# Patient Record
Sex: FEMALE | Race: WHITE | NOT HISPANIC OR LATINO | URBAN - NONMETROPOLITAN AREA
[De-identification: names, ages, dates, MRNs, and addresses within clinical notes are randomized per-mention and may not be internally consistent; named-entity substitution may affect disease eponyms.]

---

## 2023-04-05 LAB
ALANINE AMINOTRANSFERASE (SGPT) (U/L) IN SER/PLAS: 12 U/L (ref 7–45)
ALBUMIN (G/DL) IN SER/PLAS: 4.3 G/DL (ref 3.4–5)
ALKALINE PHOSPHATASE (U/L) IN SER/PLAS: 59 U/L (ref 33–110)
ANION GAP IN SER/PLAS: 14 MMOL/L (ref 10–20)
ASPARTATE AMINOTRANSFERASE (SGOT) (U/L) IN SER/PLAS: 17 U/L (ref 9–39)
BASOPHILS (10*3/UL) IN BLOOD BY AUTOMATED COUNT: 0.03 X10E9/L (ref 0–0.1)
BASOPHILS/100 LEUKOCYTES IN BLOOD BY AUTOMATED COUNT: 0.8 % (ref 0–2)
BILIRUBIN TOTAL (MG/DL) IN SER/PLAS: 0.5 MG/DL (ref 0–1.2)
CALCIUM (MG/DL) IN SER/PLAS: 9.3 MG/DL (ref 8.6–10.6)
CANCER AG 27-29 (U/ML) IN SER/PLAS: 29.6 U/ML (ref 0–38.6)
CARBON DIOXIDE, TOTAL (MMOL/L) IN SER/PLAS: 28 MMOL/L (ref 21–32)
CARCINOEMBRYONIC AG (NG/ML) IN SER/PLAS: 0.5 UG/L
CHLORIDE (MMOL/L) IN SER/PLAS: 103 MMOL/L (ref 98–107)
CREATININE (MG/DL) IN SER/PLAS: 0.81 MG/DL (ref 0.5–1.05)
EOSINOPHILS (10*3/UL) IN BLOOD BY AUTOMATED COUNT: 0.01 X10E9/L (ref 0–0.7)
EOSINOPHILS/100 LEUKOCYTES IN BLOOD BY AUTOMATED COUNT: 0.3 % (ref 0–6)
ERYTHROCYTE DISTRIBUTION WIDTH (RATIO) BY AUTOMATED COUNT: 14.9 % (ref 11.5–14.5)
ERYTHROCYTE MEAN CORPUSCULAR HEMOGLOBIN CONCENTRATION (G/DL) BY AUTOMATED: 30.8 G/DL (ref 32–36)
ERYTHROCYTE MEAN CORPUSCULAR VOLUME (FL) BY AUTOMATED COUNT: 86 FL (ref 80–100)
ERYTHROCYTES (10*6/UL) IN BLOOD BY AUTOMATED COUNT: 5.12 X10E12/L (ref 4–5.2)
GFR FEMALE: 85 ML/MIN/1.73M2
GLUCOSE (MG/DL) IN SER/PLAS: 93 MG/DL (ref 74–99)
HEMATOCRIT (%) IN BLOOD BY AUTOMATED COUNT: 44.2 % (ref 36–46)
HEMOGLOBIN (G/DL) IN BLOOD: 13.6 G/DL (ref 12–16)
IMMATURE GRANULOCYTES/100 LEUKOCYTES IN BLOOD BY AUTOMATED COUNT: 0 % (ref 0–0.9)
LEUKOCYTES (10*3/UL) IN BLOOD BY AUTOMATED COUNT: 3.9 X10E9/L (ref 4.4–11.3)
LYMPHOCYTES (10*3/UL) IN BLOOD BY AUTOMATED COUNT: 1.45 X10E9/L (ref 1.2–4.8)
LYMPHOCYTES/100 LEUKOCYTES IN BLOOD BY AUTOMATED COUNT: 37 % (ref 13–44)
MONOCYTES (10*3/UL) IN BLOOD BY AUTOMATED COUNT: 0.35 X10E9/L (ref 0.1–1)
MONOCYTES/100 LEUKOCYTES IN BLOOD BY AUTOMATED COUNT: 8.9 % (ref 2–10)
NEUTROPHILS (10*3/UL) IN BLOOD BY AUTOMATED COUNT: 2.08 X10E9/L (ref 1.2–7.7)
NEUTROPHILS/100 LEUKOCYTES IN BLOOD BY AUTOMATED COUNT: 53 % (ref 40–80)
NRBC (PER 100 WBCS) BY AUTOMATED COUNT: 0 /100 WBC (ref 0–0)
PLATELETS (10*3/UL) IN BLOOD AUTOMATED COUNT: 256 X10E9/L (ref 150–450)
POTASSIUM (MMOL/L) IN SER/PLAS: 3.7 MMOL/L (ref 3.5–5.3)
PROTEIN TOTAL: 6.6 G/DL (ref 6.4–8.2)
SODIUM (MMOL/L) IN SER/PLAS: 141 MMOL/L (ref 136–145)
UREA NITROGEN (MG/DL) IN SER/PLAS: 14 MG/DL (ref 6–23)

## 2023-08-16 PROBLEM — Z85.3 PERSONAL HISTORY OF MALIGNANT NEOPLASM OF BREAST: Status: ACTIVE | Noted: 2023-08-16

## 2023-08-16 PROBLEM — C50.211 MALIGNANT NEOPLASM OF UPPER-INNER QUADRANT OF RIGHT FEMALE BREAST (MULTI): Status: ACTIVE | Noted: 2023-08-16

## 2023-08-16 PROBLEM — R92.30 DENSE BREASTS: Status: ACTIVE | Noted: 2023-08-16

## 2023-08-16 PROBLEM — R92.2 DENSE BREASTS: Status: ACTIVE | Noted: 2023-08-16

## 2023-08-16 PROBLEM — R92.8 ABNORMAL MAMMOGRAM: Status: ACTIVE | Noted: 2023-08-16

## 2023-08-16 RX ORDER — ANASTROZOLE 1 MG/1
1 TABLET ORAL
COMMUNITY
End: 2023-10-12 | Stop reason: SDUPTHER

## 2023-08-16 RX ORDER — ALENDRONATE SODIUM 70 MG/1
70 TABLET ORAL
COMMUNITY
End: 2023-10-12 | Stop reason: SDUPTHER

## 2023-10-05 ENCOUNTER — LAB (OUTPATIENT)
Dept: LAB | Facility: CLINIC | Age: 58
End: 2023-10-05
Payer: COMMERCIAL

## 2023-10-05 DIAGNOSIS — C50.919 MALIGNANT NEOPLASM OF UNSPECIFIED SITE OF UNSPECIFIED FEMALE BREAST (MULTI): Primary | ICD-10-CM

## 2023-10-05 LAB
BASOPHILS # BLD AUTO: 0.03 X10*3/UL (ref 0–0.1)
BASOPHILS NFR BLD AUTO: 0.6 %
EOSINOPHIL # BLD AUTO: 0.08 X10*3/UL (ref 0–0.7)
EOSINOPHIL NFR BLD AUTO: 1.5 %
ERYTHROCYTE [DISTWIDTH] IN BLOOD BY AUTOMATED COUNT: 14.1 % (ref 11.5–14.5)
HCT VFR BLD AUTO: 43.9 % (ref 36–46)
HGB BLD-MCNC: 14 G/DL (ref 12–16)
IMM GRANULOCYTES # BLD AUTO: 0.01 X10*3/UL (ref 0–0.7)
IMM GRANULOCYTES NFR BLD AUTO: 0.2 % (ref 0–0.9)
LYMPHOCYTES # BLD AUTO: 1.87 X10*3/UL (ref 1.2–4.8)
LYMPHOCYTES NFR BLD AUTO: 36.1 %
MCH RBC QN AUTO: 27.6 PG (ref 26–34)
MCHC RBC AUTO-ENTMCNC: 31.9 G/DL (ref 32–36)
MCV RBC AUTO: 86 FL (ref 80–100)
MONOCYTES # BLD AUTO: 0.47 X10*3/UL (ref 0.1–1)
MONOCYTES NFR BLD AUTO: 9.1 %
NEUTROPHILS # BLD AUTO: 2.72 X10*3/UL (ref 1.2–7.7)
NEUTROPHILS NFR BLD AUTO: 52.5 %
NRBC BLD-RTO: ABNORMAL /100{WBCS}
PLATELET # BLD AUTO: 203 X10*3/UL (ref 150–450)
PMV BLD AUTO: 11.2 FL (ref 7.5–11.5)
RBC # BLD AUTO: 5.08 X10*6/UL (ref 4–5.2)
WBC # BLD AUTO: 5.2 X10*3/UL (ref 4.4–11.3)

## 2023-10-05 PROCEDURE — 85025 COMPLETE CBC W/AUTO DIFF WBC: CPT

## 2023-10-05 PROCEDURE — 82378 CARCINOEMBRYONIC ANTIGEN: CPT

## 2023-10-05 PROCEDURE — 86300 IMMUNOASSAY TUMOR CA 15-3: CPT

## 2023-10-05 PROCEDURE — 36415 COLL VENOUS BLD VENIPUNCTURE: CPT

## 2023-10-05 PROCEDURE — 84075 ASSAY ALKALINE PHOSPHATASE: CPT

## 2023-10-05 PROCEDURE — 80053 COMPREHEN METABOLIC PANEL: CPT

## 2023-10-06 ENCOUNTER — TELEPHONE (OUTPATIENT)
Dept: HEMATOLOGY/ONCOLOGY | Facility: CLINIC | Age: 58
End: 2023-10-06
Payer: COMMERCIAL

## 2023-10-06 LAB
ALBUMIN SERPL BCP-MCNC: 4.5 G/DL (ref 3.4–5)
ALP SERPL-CCNC: 57 U/L (ref 33–110)
ALT SERPL W P-5'-P-CCNC: 12 U/L (ref 7–45)
ANION GAP SERPL CALC-SCNC: 15 MMOL/L (ref 10–20)
AST SERPL W P-5'-P-CCNC: 17 U/L (ref 9–39)
BILIRUB SERPL-MCNC: 0.5 MG/DL (ref 0–1.2)
BUN SERPL-MCNC: 13 MG/DL (ref 6–23)
CALCIUM SERPL-MCNC: 9.7 MG/DL (ref 8.6–10.6)
CANCER AG27-29 SERPL-ACNC: 31.3 U/ML (ref 0–38.6)
CEA SERPL-MCNC: <0.5 UG/L
CHLORIDE SERPL-SCNC: 104 MMOL/L (ref 98–107)
CO2 SERPL-SCNC: 28 MMOL/L (ref 21–32)
CREAT SERPL-MCNC: 0.78 MG/DL (ref 0.5–1.05)
GFR SERPL CREATININE-BSD FRML MDRD: 89 ML/MIN/1.73M*2
GLUCOSE SERPL-MCNC: 78 MG/DL (ref 74–99)
POTASSIUM SERPL-SCNC: 4.1 MMOL/L (ref 3.5–5.3)
PROT SERPL-MCNC: 6.9 G/DL (ref 6.4–8.2)
SODIUM SERPL-SCNC: 143 MMOL/L (ref 136–145)

## 2023-10-06 NOTE — TELEPHONE ENCOUNTER
Call placed to patient to request to bump FUV x 6 weeks due to implementation of new computer system.  Message left on identifiable voicemail requesting to move out follow-up x 6 weeks.  Offered to review lab results with nurse.  Call back instructions left.

## 2023-10-09 ENCOUNTER — TELEPHONE (OUTPATIENT)
Dept: HEMATOLOGY/ONCOLOGY | Facility: CLINIC | Age: 58
End: 2023-10-09
Payer: COMMERCIAL

## 2023-10-09 NOTE — TELEPHONE ENCOUNTER
Loly said that she was called on Friday about moving her appointment out 6 weeks. She is scheduled tomorrow. She said she will plan on showing up to tomorrows appointment unless she is called today to reschedule.

## 2023-10-09 NOTE — TELEPHONE ENCOUNTER
Spoke to Loly and she is agreeable to cancel her FUV with Dr. Christina tomorrow on 10/10/23 and reschedule for sometime in November.  States she already had her labs drawn so no need to reorder any additional labs for this FUV.  Message forwarded to reshma, Chica Cutler to make adjustments to schedule.

## 2023-10-10 ENCOUNTER — APPOINTMENT (OUTPATIENT)
Dept: HEMATOLOGY/ONCOLOGY | Facility: CLINIC | Age: 58
End: 2023-10-10
Payer: COMMERCIAL

## 2023-10-12 ENCOUNTER — TELEPHONE (OUTPATIENT)
Dept: HEMATOLOGY/ONCOLOGY | Facility: HOSPITAL | Age: 58
End: 2023-10-12
Payer: COMMERCIAL

## 2023-10-12 DIAGNOSIS — C50.211 MALIGNANT NEOPLASM OF UPPER-INNER QUADRANT OF RIGHT FEMALE BREAST, UNSPECIFIED ESTROGEN RECEPTOR STATUS (MULTI): ICD-10-CM

## 2023-10-12 RX ORDER — ANASTROZOLE 1 MG/1
1 TABLET ORAL DAILY
Qty: 90 TABLET | Refills: 1 | Status: SHIPPED | OUTPATIENT
Start: 2023-10-12 | End: 2024-04-09 | Stop reason: WASHOUT

## 2023-10-12 RX ORDER — ALENDRONATE SODIUM 70 MG/1
70 TABLET ORAL
Qty: 12 TABLET | Refills: 3 | Status: SHIPPED | OUTPATIENT
Start: 2023-10-12

## 2023-10-12 NOTE — TELEPHONE ENCOUNTER
Patient calling to let us know [facility]  Please send Arimidex and Fosamax.

## 2023-10-24 ENCOUNTER — TELEPHONE (OUTPATIENT)
Dept: HEMATOLOGY/ONCOLOGY | Facility: CLINIC | Age: 58
End: 2023-10-24
Payer: COMMERCIAL

## 2023-10-24 NOTE — TELEPHONE ENCOUNTER
Patient states that she is trying to refill Anastrozole 1mg and [facility] is waiting on a response from our office.    Please advise so she can get prescription refilled.

## 2023-10-24 NOTE — TELEPHONE ENCOUNTER
Reviewed medications.  Noted refill sent on the 12th.  Call returned to patient.  Patient will call [facility] and call back if any issues.  Call back instructions reviewed.  Patient verbalized understanding.

## 2023-11-01 NOTE — TELEPHONE ENCOUNTER
Call received from [facility] after hours.  Call returned.  Patient identifiers provided.  Per [facility] - No patient on file.  Called patient who confirmed [facility] under her name.  ID provided by maura. [id number].  Call again placed to [facility].  Per rep no RX for Arimidex received.  Confirmed by EMR receipt of RX on 10/12 at 12:34.   Rep reached out to pharmacy.  Confirmed RX was there and will contact patient to arrange delivery,  Call back instructions reviewed.  Patient verbalized understanding.

## 2023-11-17 ASSESSMENT — PATIENT HEALTH QUESTIONNAIRE - PHQ9
3. TROUBLE FALLING OR STAYING ASLEEP OR SLEEPING TOO MUCH: NOT AT ALL
6. FEELING BAD ABOUT YOURSELF - OR THAT YOU ARE A FAILURE OR HAVE LET YOURSELF OR YOUR FAMILY DOWN: NOT AT ALL
7. TROUBLE CONCENTRATING ON THINGS, SUCH AS READING THE NEWSPAPER OR WATCHING TELEVISION: NOT AT ALL
1. LITTLE INTEREST OR PLEASURE IN DOING THINGS: NOT AT ALL
2. FEELING DOWN, DEPRESSED, IRRITABLE, OR HOPELESS: NOT AT ALL
2. FEELING DOWN, DEPRESSED, IRRITABLE, OR HOPELESS: 0
SUM OF ALL RESPONSES TO PHQ QUESTIONS 1-9: 0
3. TROUBLE FALLING OR STAYING ASLEEP OR SLEEPING TOO MUCH: NOT AT ALL
10. IF YOU CHECKED OFF ANY PROBLEMS, HOW DIFFICULT HAVE THESE PROBLEMS MADE IT FOR YOU TO DO YOUR WORK, TAKE CARE OF THINGS AT HOME, OR GET ALONG WITH OTHER PEOPLE: NOT DIFFICULT AT ALL
3. TROUBLE FALLING OR STAYING ASLEEP OR SLEEPING TOO MUCH: 0
9. THOUGHTS THAT YOU WOULD BE BETTER OFF DEAD, OR OF HURTING YOURSELF: NOT AT ALL
8. MOVING OR SPEAKING SO SLOWLY THAT OTHER PEOPLE COULD HAVE NOTICED. OR THE OPPOSITE, BEING SO FIGETY OR RESTLESS THAT YOU HAVE BEEN MOVING AROUND A LOT MORE THAN USUAL: 0
7. TROUBLE CONCENTRATING ON THINGS, SUCH AS READING THE NEWSPAPER OR WATCHING TELEVISION: NOT AT ALL
9. THOUGHTS THAT YOU WOULD BE BETTER OFF DEAD, OR OF HURTING YOURSELF: 0
4. FEELING TIRED OR HAVING LITTLE ENERGY: NOT AT ALL
8. MOVING OR SPEAKING SO SLOWLY THAT OTHER PEOPLE COULD HAVE NOTICED. OR THE OPPOSITE, BEING SO FIGETY OR RESTLESS THAT YOU HAVE BEEN MOVING AROUND A LOT MORE THAN USUAL: NOT AT ALL
1. LITTLE INTEREST OR PLEASURE IN DOING THINGS: NOT AT ALL
5. POOR APPETITE OR OVEREATING: NOT AT ALL
6. FEELING BAD ABOUT YOURSELF - OR THAT YOU ARE A FAILURE OR HAVE LET YOURSELF OR YOUR FAMILY DOWN: 0
6. FEELING BAD ABOUT YOURSELF - OR THAT YOU ARE A FAILURE OR HAVE LET YOURSELF OR YOUR FAMILY DOWN: NOT AT ALL
5. POOR APPETITE OR OVEREATING: 0
2. FEELING DOWN, DEPRESSED OR HOPELESS: NOT AT ALL
4. FEELING TIRED OR HAVING LITTLE ENERGY: 0
4. FEELING TIRED OR HAVING LITTLE ENERGY: NOT AT ALL
1. LITTLE INTEREST OR PLEASURE IN DOING THINGS: 0
7. TROUBLE CONCENTRATING ON THINGS, SUCH AS READING THE NEWSPAPER OR WATCHING TELEVISION: 0
9. THOUGHTS THAT YOU WOULD BE BETTER OFF DEAD, OR OF HURTING YOURSELF: NOT AT ALL
8. MOVING OR SPEAKING SO SLOWLY THAT OTHER PEOPLE COULD HAVE NOTICED. OR THE OPPOSITE, BEING SO FIGETY OR RESTLESS THAT YOU HAVE BEEN MOVING AROUND A LOT MORE THAN USUAL: NOT AT ALL
5. POOR APPETITE OR OVEREATING: NOT AT ALL
SUM OF ALL RESPONSES TO PHQ QUESTIONS 1-9: 0
10. IF YOU CHECKED OFF ANY PROBLEMS, HOW DIFFICULT HAVE THESE PROBLEMS MADE IT FOR YOU TO DO YOUR WORK, TAKE CARE OF THINGS AT HOME, OR GET ALONG WITH OTHER PEOPLE: NOT DIFFICULT AT ALL

## 2023-11-21 ENCOUNTER — OFFICE VISIT (OUTPATIENT)
Dept: HEMATOLOGY/ONCOLOGY | Facility: CLINIC | Age: 58
End: 2023-11-21
Payer: COMMERCIAL

## 2023-11-21 VITALS
TEMPERATURE: 97.2 F | OXYGEN SATURATION: 98 % | BODY MASS INDEX: 24.18 KG/M2 | SYSTOLIC BLOOD PRESSURE: 125 MMHG | HEIGHT: 63 IN | HEART RATE: 62 BPM | WEIGHT: 136.47 LBS | DIASTOLIC BLOOD PRESSURE: 80 MMHG | RESPIRATION RATE: 12 BRPM

## 2023-11-21 DIAGNOSIS — Z17.0 MALIGNANT NEOPLASM OF UPPER-INNER QUADRANT OF RIGHT BREAST IN FEMALE, ESTROGEN RECEPTOR POSITIVE (MULTI): Primary | ICD-10-CM

## 2023-11-21 DIAGNOSIS — M85.852 OSTEOPENIA OF NECK OF LEFT FEMUR: ICD-10-CM

## 2023-11-21 DIAGNOSIS — C50.211 MALIGNANT NEOPLASM OF UPPER-INNER QUADRANT OF RIGHT BREAST IN FEMALE, ESTROGEN RECEPTOR POSITIVE (MULTI): Primary | ICD-10-CM

## 2023-11-21 PROCEDURE — 99214 OFFICE O/P EST MOD 30 MIN: CPT | Performed by: INTERNAL MEDICINE

## 2023-11-21 RX ORDER — ALENDRONATE SODIUM 70 MG/1
TABLET ORAL
COMMUNITY

## 2023-11-21 RX ORDER — ANASTROZOLE 1 MG/1
TABLET ORAL
COMMUNITY
End: 2024-04-09 | Stop reason: WASHOUT

## 2023-11-21 ASSESSMENT — LIFESTYLE VARIABLES
HOW OFTEN DO YOU HAVE SIX OR MORE DRINKS ON ONE OCCASION: NEVER
HOW MANY STANDARD DRINKS CONTAINING ALCOHOL DO YOU HAVE ON A TYPICAL DAY: PATIENT DOES NOT DRINK

## 2023-11-21 ASSESSMENT — PATIENT HEALTH QUESTIONNAIRE - PHQ9
1. LITTLE INTEREST OR PLEASURE IN DOING THINGS: NOT AT ALL
2. FEELING DOWN, DEPRESSED OR HOPELESS: NOT AT ALL
SUM OF ALL RESPONSES TO PHQ9 QUESTIONS 1 & 2: 0

## 2023-11-21 ASSESSMENT — PAIN SCALES - GENERAL: PAINLEVEL: 0-NO PAIN

## 2023-11-21 NOTE — PROGRESS NOTES
Patient ID: Loly Jacobs is a 58 y.o. female.  Referring Physician: No referring provider defined for this encounter.  Primary Care Provider: Emmanuelle Angeles DO        Patient Instructions:     cont Arimidex, fosamax, calcium with vit D  CBC, CMP, CA 27-29, CEA iin 6 months    RTC 6 m        ASSESSMENT, PROBLEM LIST, DECISION MAKING, PLAN.        1. Stage I, T1b N0 M0 invasive ductal carcinoma of right upper inner    quadrant, diagnosed in April 2016, tumor was 9 mm in size.  One    sentinel lymph node was negative.  The patient had a surgery.  The    patient had lumpectomy and sentinel lymph node biopsy by Dr. Hurd on April 7, 2016.  ER was greater than 95% positive.  NM    was greater than 95% positive.  HER2/carolann initially was negative    with FISH ratio 1.1, although repeat HER2/carolann testing done on April 16, 2016, on the lumpectomy specimen showed HER2/carolann by FISH was    positive at 2.0.  The patient was offered adjuvant chemotherapy    plus Herceptin because of 9 mm tumor and HER2/carolann positive tumor,    although the patient declined chemotherapy, so she received    radiation therapy from May 2, 2016, through May 23, 2016, and later    was started on Arimidex, calcium, vitamin D, and Fosamax on July 11, 2016.    2. Osteopenia with T-score -1.4 in June 2018, follow-up in April, 2021 showed T score-1.7.  She was continued Fosamax with calcium and vitamin D.  Repeat bone density in April 2023 showed T score -1.5      INTERVAL HISTORY:  Patient is seen for followup on stage I right breast carcinoma.  Patient is currently taking Arimidex calcium vitamin D and Fosamax which was started in July 2016,   patient has been doing well denies any headache fever cough chest pain shortness of breath urinary symptom overall feels good denies any palpable mass in the breast.        She had a mammogram on June 22, 2023, was category 2/negative, she had a DEXA scan in April 2023 showed T score -1.5 in the left  femur, otherwise was negative.    Patient CBC CMP CA 27-29 and CEA was normal on October 5, 2023.      Patient denies any nausea vomiting headache fever cough chest pain shortness of breath urinary symptoms headache or seizures      PHYSICAL EXAMINATION:    General: Conscious, alert, oriented x3, not in acute distress.  HEENT:    No icterus.    Chest:Bilateral symmetrical,     CVS:  S1, S2.    Abdomen:  Soft, no palpable mass   Extremities: No clubbing, cyanosis,     Skin: No petechial rash.    Breast exam was done today shows no palpable mass in the breast, bilateral axilla without any lymphadenopathy, no nipple discharge in Nov 2023      ASSESSMENT AND PLAN:      Ms. Shetty  with a T1b N0 M0 stage I right upper inner quadrant breast carcinomadiagnosed in April 2016.  Had ER/WA positive and HER2/carolann positive.patient had lumpectomy followed by radiation therapy and she has not received any chemotherapy or Herceptin, and she has been on Arimidex started in July 2016,continue Arimidex, calcium, vitamin D, and Fosamax as planned.        She had a mammogram on June 22, 2023, was category 2/negative, she had a DEXA scan in April 2023 showed T score -1.5 in the left femur, otherwise was negative.    Patient CBC CMP CA 27-29 and CEA was normal on October 5, 2023.      Patient will take total of 10 years of Arimidex as she had a HER-2/carolann positive disease,    Osteopenia, last DEXA scan in April 2021 showed T score -1.7, continue Fosamax calcium and vitamin D.    Repeat DEXA scan in April 2023 showed T score -1.5, stable, continue current treatment.        Patient's father had prostate carcinoma, I explained to the patient that there is a potential possibility of genetic factor causing breast cancer as well as prostate cancer and offered her genetic counseling and possible genetic testing if she is interested,   Decided not to do at this time but will let me know in case if she wants to pursue it.        Return for follow-up  in 6-month        LABS:  No visits with results within 1 Month(s) from this visit.   Latest known visit with results is:   Lab on 10/05/2023   Component Date Value Ref Range Status    Glucose 10/05/2023 78  74 - 99 mg/dL Final    Sodium 10/05/2023 143  136 - 145 mmol/L Final    Potassium 10/05/2023 4.1  3.5 - 5.3 mmol/L Final    Chloride 10/05/2023 104  98 - 107 mmol/L Final    Bicarbonate 10/05/2023 28  21 - 32 mmol/L Final    Anion Gap 10/05/2023 15  10 - 20 mmol/L Final    Urea Nitrogen 10/05/2023 13  6 - 23 mg/dL Final    Creatinine 10/05/2023 0.78  0.50 - 1.05 mg/dL Final    eGFR 10/05/2023 89  >60 mL/min/1.73m*2 Final    Calcium 10/05/2023 9.7  8.6 - 10.6 mg/dL Final    Albumin 10/05/2023 4.5  3.4 - 5.0 g/dL Final    Alkaline Phosphatase 10/05/2023 57  33 - 110 U/L Final    Total Protein 10/05/2023 6.9  6.4 - 8.2 g/dL Final    AST 10/05/2023 17  9 - 39 U/L Final    Bilirubin, Total 10/05/2023 0.5  0.0 - 1.2 mg/dL Final    ALT 10/05/2023 12  7 - 45 U/L Final    WBC 10/05/2023 5.2  4.4 - 11.3 x10*3/uL Final    nRBC 10/05/2023    Final    RBC 10/05/2023 5.08  4.00 - 5.20 x10*6/uL Final    Hemoglobin 10/05/2023 14.0  12.0 - 16.0 g/dL Final    Hematocrit 10/05/2023 43.9  36.0 - 46.0 % Final    MCV 10/05/2023 86  80 - 100 fL Final    MCH 10/05/2023 27.6  26.0 - 34.0 pg Final    MCHC 10/05/2023 31.9 (L)  32.0 - 36.0 g/dL Final    RDW 10/05/2023 14.1  11.5 - 14.5 % Final    Platelets 10/05/2023 203  150 - 450 x10*3/uL Final    MPV 10/05/2023 11.2  7.5 - 11.5 fL Final    Neutrophils % 10/05/2023 52.5  40.0 - 80.0 % Final    Immature Granulocytes %, Automated 10/05/2023 0.2  0.0 - 0.9 % Final    Lymphocytes % 10/05/2023 36.1  13.0 - 44.0 % Final    Monocytes % 10/05/2023 9.1  2.0 - 10.0 % Final    Eosinophils % 10/05/2023 1.5  0.0 - 6.0 % Final    Basophils % 10/05/2023 0.6  0.0 - 2.0 % Final    Neutrophils Absolute 10/05/2023 2.72  1.20 - 7.70 x10*3/uL Final    Immature Granulocytes Absolute, Au* 10/05/2023 0.01  0.00  "- 0.70 x10*3/uL Final    Lymphocytes Absolute 10/05/2023 1.87  1.20 - 4.80 x10*3/uL Final    Monocytes Absolute 10/05/2023 0.47  0.10 - 1.00 x10*3/uL Final    Eosinophils Absolute 10/05/2023 0.08  0.00 - 0.70 x10*3/uL Final    Basophils Absolute 10/05/2023 0.03  0.00 - 0.10 x10*3/uL Final    CA 27.29 10/05/2023 31.3  0.0 - 38.6 U/mL Final    Carcinoembryonic AG 10/05/2023 <0.5  ug/L Final       IMAGING:  No results found.    VITALS: BSA: 1.66 meters squared /80   Pulse 62   Temp 36.2 °C (97.2 °F)   Resp 12   Ht 1.596 m (5' 2.84\")   Wt 61.9 kg (136 lb 7.4 oz)   SpO2 98%   BMI 24.30 kg/m²     Current Outpatient Medications   Medication Sig Dispense Refill    alendronate (Fosamax) 70 mg tablet Take 1 tablet (70 mg) by mouth every 7 days. 12 tablet 3    anastrozole (Arimidex) 1 mg tablet Take 1 tablet (1 mg total) by mouth once daily. 90 tablet 1    alendronate (Fosamax) 70 mg tablet Take by mouth.      anastrozole (Arimidex) 1 mg tablet Take by mouth.       No current facility-administered medications for this visit.       ALLERGY: Patient has no known allergies.    SOCIAL HISTORY: She  reports current alcohol use. She  reports no history of drug use.      no smoking, occ wine    FHX  father has prostate ca    Allergy - none(1)      Medication reviewed in e-chart  Patient is monitored for medication toxicity  labs reviewed and interpreted independently, X rays independently reviewed  Notes from other physicians involved in care were reviewed    Charting was completed using voice recognition technology and may include unintended errors.    JULIANNE ALVAREZ MD, RHODA.    Nathanael Turner Master Clinician in Hematology and Oncology  Medical Director, [facility] at [facility].  [place] office  Phone [phone]  Fax      [phone]  [facility] /[place].  Phone [phone]  Fax     [phone] "

## 2023-11-21 NOTE — PROGRESS NOTES
Patient to continue Arimidex.  Follow-up with labs prior in 6 months.  Call back instructions reviewed.  Patient verbalized understanding.

## 2024-04-09 ENCOUNTER — TELEPHONE (OUTPATIENT)
Dept: HEMATOLOGY/ONCOLOGY | Facility: CLINIC | Age: 59
End: 2024-04-09
Payer: COMMERCIAL

## 2024-04-09 DIAGNOSIS — C50.211 MALIGNANT NEOPLASM OF UPPER-INNER QUADRANT OF RIGHT FEMALE BREAST, UNSPECIFIED ESTROGEN RECEPTOR STATUS (MULTI): ICD-10-CM

## 2024-04-09 RX ORDER — ANASTROZOLE 1 MG/1
1 TABLET ORAL DAILY
Qty: 90 TABLET | Refills: 1 | Status: SHIPPED | OUTPATIENT
Start: 2024-04-09

## 2024-04-09 NOTE — TELEPHONE ENCOUNTER
[facility] requesting refill on Anastrozole 1mg take one tablet daily.  Fax [phone]  Last appointment 11/21/23 Next appointment 05/21/24

## 2024-05-16 ENCOUNTER — LAB (OUTPATIENT)
Dept: LAB | Facility: CLINIC | Age: 59
End: 2024-05-16
Payer: COMMERCIAL

## 2024-05-16 DIAGNOSIS — M85.852 OSTEOPENIA OF NECK OF LEFT FEMUR: ICD-10-CM

## 2024-05-16 DIAGNOSIS — C50.211 MALIGNANT NEOPLASM OF UPPER-INNER QUADRANT OF RIGHT BREAST IN FEMALE, ESTROGEN RECEPTOR POSITIVE (MULTI): ICD-10-CM

## 2024-05-16 DIAGNOSIS — Z17.0 MALIGNANT NEOPLASM OF UPPER-INNER QUADRANT OF RIGHT BREAST IN FEMALE, ESTROGEN RECEPTOR POSITIVE (MULTI): ICD-10-CM

## 2024-05-16 LAB
BASOPHILS # BLD AUTO: 0.02 X10*3/UL (ref 0–0.1)
BASOPHILS NFR BLD AUTO: 0.6 %
EOSINOPHIL # BLD AUTO: 0.05 X10*3/UL (ref 0–0.7)
EOSINOPHIL NFR BLD AUTO: 1.4 %
ERYTHROCYTE [DISTWIDTH] IN BLOOD BY AUTOMATED COUNT: 14.3 % (ref 11.5–14.5)
HCT VFR BLD AUTO: 46.9 % (ref 36–46)
HGB BLD-MCNC: 15.2 G/DL (ref 12–16)
IMM GRANULOCYTES # BLD AUTO: 0 X10*3/UL (ref 0–0.7)
IMM GRANULOCYTES NFR BLD AUTO: 0 % (ref 0–0.9)
LYMPHOCYTES # BLD AUTO: 1.37 X10*3/UL (ref 1.2–4.8)
LYMPHOCYTES NFR BLD AUTO: 38.6 %
MCH RBC QN AUTO: 27.4 PG (ref 26–34)
MCHC RBC AUTO-ENTMCNC: 32.4 G/DL (ref 32–36)
MCV RBC AUTO: 85 FL (ref 80–100)
MONOCYTES # BLD AUTO: 0.49 X10*3/UL (ref 0.1–1)
MONOCYTES NFR BLD AUTO: 13.8 %
NEUTROPHILS # BLD AUTO: 1.62 X10*3/UL (ref 1.2–7.7)
NEUTROPHILS NFR BLD AUTO: 45.6 %
NRBC BLD-RTO: ABNORMAL /100{WBCS}
PLATELET # BLD AUTO: 168 X10*3/UL (ref 150–450)
RBC # BLD AUTO: 5.55 X10*6/UL (ref 4–5.2)
WBC # BLD AUTO: 3.6 X10*3/UL (ref 4.4–11.3)

## 2024-05-16 PROCEDURE — 85025 COMPLETE CBC W/AUTO DIFF WBC: CPT

## 2024-05-16 PROCEDURE — 86300 IMMUNOASSAY TUMOR CA 15-3: CPT

## 2024-05-16 PROCEDURE — 36415 COLL VENOUS BLD VENIPUNCTURE: CPT

## 2024-05-16 PROCEDURE — 84075 ASSAY ALKALINE PHOSPHATASE: CPT

## 2024-05-16 PROCEDURE — 82378 CARCINOEMBRYONIC ANTIGEN: CPT

## 2024-05-17 LAB
ALBUMIN SERPL BCP-MCNC: 4.6 G/DL (ref 3.4–5)
ALP SERPL-CCNC: 65 U/L (ref 33–110)
ALT SERPL W P-5'-P-CCNC: 17 U/L (ref 7–45)
ANION GAP SERPL CALC-SCNC: 12 MMOL/L (ref 10–20)
AST SERPL W P-5'-P-CCNC: 22 U/L (ref 9–39)
BILIRUB SERPL-MCNC: 0.5 MG/DL (ref 0–1.2)
BUN SERPL-MCNC: 19 MG/DL (ref 6–23)
CALCIUM SERPL-MCNC: 9.9 MG/DL (ref 8.6–10.6)
CANCER AG27-29 SERPL-ACNC: 31.6 U/ML (ref 0–38.6)
CEA SERPL-MCNC: <0.5 UG/L
CHLORIDE SERPL-SCNC: 102 MMOL/L (ref 98–107)
CO2 SERPL-SCNC: 32 MMOL/L (ref 21–32)
CREAT SERPL-MCNC: 0.84 MG/DL (ref 0.5–1.05)
EGFRCR SERPLBLD CKD-EPI 2021: 81 ML/MIN/1.73M*2
GLUCOSE SERPL-MCNC: 95 MG/DL (ref 74–99)
POTASSIUM SERPL-SCNC: 4.7 MMOL/L (ref 3.5–5.3)
PROT SERPL-MCNC: 7.1 G/DL (ref 6.4–8.2)
SODIUM SERPL-SCNC: 141 MMOL/L (ref 136–145)

## 2024-05-21 ENCOUNTER — OFFICE VISIT (OUTPATIENT)
Dept: HEMATOLOGY/ONCOLOGY | Facility: CLINIC | Age: 59
End: 2024-05-21
Payer: COMMERCIAL

## 2024-05-21 VITALS
WEIGHT: 139.77 LBS | RESPIRATION RATE: 16 BRPM | BODY MASS INDEX: 24.89 KG/M2 | SYSTOLIC BLOOD PRESSURE: 116 MMHG | DIASTOLIC BLOOD PRESSURE: 77 MMHG | HEART RATE: 66 BPM | OXYGEN SATURATION: 98 % | TEMPERATURE: 98.4 F

## 2024-05-21 DIAGNOSIS — Z17.0 MALIGNANT NEOPLASM OF UPPER-INNER QUADRANT OF RIGHT BREAST IN FEMALE, ESTROGEN RECEPTOR POSITIVE (MULTI): ICD-10-CM

## 2024-05-21 DIAGNOSIS — M85.852 OSTEOPENIA OF NECK OF LEFT FEMUR: ICD-10-CM

## 2024-05-21 DIAGNOSIS — C50.211 MALIGNANT NEOPLASM OF UPPER-INNER QUADRANT OF RIGHT BREAST IN FEMALE, ESTROGEN RECEPTOR POSITIVE (MULTI): ICD-10-CM

## 2024-05-21 PROCEDURE — 99214 OFFICE O/P EST MOD 30 MIN: CPT | Performed by: INTERNAL MEDICINE

## 2024-05-21 ASSESSMENT — PAIN SCALES - GENERAL: PAINLEVEL: 0-NO PAIN

## 2024-05-21 NOTE — PROGRESS NOTES
Mammogram after 6/22 at [facility].  Continue Arimidex, Calcium and Vitamin D.  Follow-up with labs prior in 6 months.  Call back instructions reviewed.  Patient verbalized understanding.

## 2024-05-21 NOTE — PROGRESS NOTES
Patient ID: Loly Jacobs is a 59 y.o. female.  Referring Physician: Isaac Christina MD  [address], [place] [address]  Primary Care Provider: Emmanuelle Angeles DO    ORDERS & PATIENT INSTRUCTIONS:    Mammogram after June 22, 2024 onward    cont Arimidex, fosamax, calcium with vit D  CBC, CMP, CA 27-29, CEA iin 6 months    RTC 6 m        ASSESSMENT, PROBLEM LIST, DECISION MAKING, PLAN.        1. Stage I, T1b N0 M0 invasive ductal carcinoma of right upper inner    quadrant, diagnosed in April 2016, tumor was 9 mm in size.  One    sentinel lymph node was negative.  The patient had a surgery.  The    patient had lumpectomy and sentinel lymph node biopsy by Dr. Hurd on April 7, 2016.  ER was greater than 95% positive.  MO    was greater than 95% positive.  HER2/carolann initially was negative    with FISH ratio 1.1, although repeat HER2/carolann testing done on April 16, 2016, on the lumpectomy specimen showed HER2/carolann by FISH was    positive at 2.0.  The patient was offered adjuvant chemotherapy    plus Herceptin because of 9 mm tumor and HER2/carolann positive tumor,    although the patient declined chemotherapy, so she received    radiation therapy from May 2, 2016, through May 23, 2016, and later    was started on Arimidex, calcium, vitamin D, and Fosamax on July 11, 2016.    2. Osteopenia with T-score -1.4 in June 2018, follow-up in April, 2021 showed T score-1.7.  She was continued Fosamax with calcium and vitamin D.  Repeat bone density in April 2023 showed T score -1.5      INTERVAL HISTORY:  Patient is seen for followup on stage I right breast carcinoma.  Patient is currently taking Arimidex calcium vitamin D and Fosamax which was started in July 2016,   patient has been doing well denies any headache fever cough chest pain shortness of breath urinary symptom overall feels good denies any palpable mass in the breast.        She had a mammogram on June 22, 2023, was  category 2/negative, she had a DEXA scan in April 2023 showed T score -1.5 in the left femur, otherwise was negative.    Patient CBC CMP CA 27-29 and CEA was normal on October 5, 2023.      Patient denies any nausea vomiting headache fever cough chest pain shortness of breath urinary symptoms headache or seizures      PHYSICAL EXAMINATION:    General: Conscious, alert, oriented x3, not in acute distress.  HEENT:    No icterus.    Chest:Bilateral symmetrical,     CVS:  S1, S2.    Abdomen:  Soft, no palpable mass   Extremities: No clubbing, cyanosis,     Skin: No petechial rash.    Breast exam was done today shows no palpable mass in the breast, bilateral axilla without any lymphadenopathy, no nipple discharge in Nov 2023      ASSESSMENT AND PLAN:      Ms. Shetty  with a T1b N0 M0 stage I right upper inner quadrant breast carcinomadiagnosed in April 2016.  Had ER/VT positive and HER2/carolann positive.patient had lumpectomy followed by radiation therapy and she has not received any chemotherapy or Herceptin, and she has been on Arimidex started in July 2016, continue Arimidex, calcium, vitamin D, and Fosamax as planned.        She had a mammogram on June 22, 2023, was category 2/negative, she had a DEXA scan in April 2023 showed T score -1.5 in the left femur, otherwise was negative.    Patient CBC CMP CA 27-29 and CEA was normal on October 5, 2023.      Patient will take total of 10 years of Arimidex as she had a HER-2/carolann positive disease,    Osteopenia, last DEXA scan in April 2021 showed T score -1.7, continue Fosamax calcium and vitamin D.    Repeat DEXA scan in April 2023 showed T score -1.5, stable, continue current treatment.        Patient's father had prostate carcinoma, I explained to the patient that there is a potential possibility of genetic factor causing breast cancer as well as prostate cancer and offered her genetic counseling and possible genetic testing if she is interested,   Decided not to do at this  "time but will let me know in case if she wants to pursue it.        Return for follow-up in 6-month          VITALS:   1.68 meters squared /77   Pulse 66   Temp 36.9 °C (98.4 °F) (Temporal)   Resp 16   Wt 63.4 kg (139 lb 12.4 oz)   SpO2 98%   BMI 24.89 kg/m²     LABS:    CBC:  Recent Labs     05/16/24  0957 10/05/23  1314 04/04/23  1251 09/29/22  0933 04/05/22  1538   WBC 3.6* 5.2 3.9* 4.4 4.7   HGB 15.2 14.0 13.6 14.7 13.9   HCT 46.9* 43.9 44.2 47.6* 44.5    203 256 212 184   MCV 85 86 86 87 86       CMP:  Recent Labs     05/16/24  0957 10/05/23  1314 04/04/23  1251 09/29/22  0933 04/05/22  1537    143 141 142 141   K 4.7 4.1 3.7 4.4 4.3    104 103 103 103   CO2 32 28 28 30 28   ANIONGAP 12 15 14 13 14   BUN 19 13 14 18 13   CREATININE 0.84 0.78 0.81 0.94 0.81   EGFR 81 89  --   --   --      Recent Labs     05/16/24  0957 10/05/23  1314 04/04/23  1251 09/29/22  0933 04/05/22  1537   ALBUMIN 4.6 4.5 4.3 4.7 4.5   ALKPHOS 65 57 59 55 59   ALT 17 12 12 13 17   AST 22 17 17 17 19   BILITOT 0.5 0.5 0.5 0.5 0.5       HEME/ENDO:No results for input(s): \"FERRITIN\", \"IRONSAT\", \"TSH\", \"FGOLDXUX00\", \"FOLATE\" in the last 84726 hours.     MICRO: No results for input(s): \"ESR\", \"CRP\", \"PROCAL\" in the last 45452 hours.  No results found for the last 90 days.        TUMOR MARKERS:  CA 27.29 (U/mL)   Date Value   05/16/2024 31.6   10/05/2023 31.3     Carcinoembryonic AG (ug/L)   Date Value   05/16/2024 <0.5   10/05/2023 <0.5                IMAGING:         BI mammo bilateral screening tomosynthesis  Narrative: Interpreted By:  TRACY GREGORIO MD  MRN: [id number]  Patient Name: AFIA ELIZALDE     STUDY:  DIGITAL MAMM SCREENING W/ ALVARO;  6/22/2023 10:07 am     ACCESSION NUMBER(S):  [id number]     ORDERING CLINICIAN:  JULIANNE ALVAREZ     INDICATION:  Screening. History of right lumpectomy with radiation therapy.     COMPARISON:  06/20/2022, 06/17/2021, 06/11/2020     FINDINGS:  2D and tomosynthesis images " were reviewed at 1 mm slice thickness.     The breast tissue is heterogeneously dense, which may obscure small  masses.  There is stable architectural distortion with surgical clips  seen within the medial superior posterior aspect of the right breast.  There are diffuse bilateral benign calcifications is present. No  suspicious masses or calcifications are identified.     Impression: No mammographic evidence of malignancy.     BI-RADS CATEGORY:     Category: 2 - Benign.  Recommendation: 1 Year Screening.     For any future breast imaging appointments, please call [phone]).     Patient letter sent SNORM             Current Outpatient Medications   Medication Sig Dispense Refill    alendronate (Fosamax) 70 mg tablet Take 1 tablet (70 mg) by mouth every 7 days. 12 tablet 3    alendronate (Fosamax) 70 mg tablet Take by mouth.      anastrozole (Arimidex) 1 mg tablet Take 1 tablet (1 mg total) by mouth once daily. 90 tablet 1     No current facility-administered medications for this visit.                  SOCIAL HISTORY:    reports current alcohol use.   reports no history of drug use.,   Tobacco Use: Low Risk  (11/21/2023)    Patient History     Smoking Tobacco Use: Never     Smokeless Tobacco Use: Never     Passive Exposure: Not on file       FAMILY HISTORY:  Family History   Problem Relation Name Age of Onset    Prostate cancer Father         ALLERGY:   Patient has no known allergies.            SOCIAL HISTORY: She  reports current alcohol use. She  reports no history of drug use.      no smoking, occ wine    FHX  father has prostate ca    Allergy - none(1)        Medication reviewed in e-chart  Patient is monitored for medication toxicity  labs reviewed and interpreted independently, X rays independently reviewed  Notes from other physicians involved in care were reviewed    Charting was completed using voice recognition technology and may include unintended errors.    JULIANNE ALVAREZ MD,  KIESHA Calvert and Kevin Turner Master Clinician in Hematology and Oncology  Medical Director, [facility] at [facility].  [place] office  Phone [phone]  Fax      [phone]  [facility] /[place].  Phone [phone]  Fax     [phone]

## 2024-07-22 ENCOUNTER — TELEPHONE (OUTPATIENT)
Dept: HEMATOLOGY/ONCOLOGY | Facility: CLINIC | Age: 59
End: 2024-07-22
Payer: COMMERCIAL

## 2024-07-22 NOTE — TELEPHONE ENCOUNTER
Dr. Christina patient. Patient called says something she is doing requires her to get a TB vaccine and she wanted to know if it will interfere with her Tamoxifen or Fosamax. Please call her @ [phone]

## 2024-07-22 NOTE — TELEPHONE ENCOUNTER
Call returned to pt regarding TB vaccine question. Reached pt's identifiable VM and left a voice message that Dr Christina is out of the office today returning 7/23/24 and that we will discuss at that time and reach back out to pt.

## 2024-07-24 NOTE — TELEPHONE ENCOUNTER
MD Pura Perez, JOVAN; Marilu Bentley, RN  Caller: Unspecified (2 days ago,  1:49 PM)  No problem with taking TB vaccine from oncology standpoint     Call returned to patient.  Notified of above.  Call back instructions reviewed.  Patient verbalized understanding.

## 2024-10-07 ENCOUNTER — TELEPHONE (OUTPATIENT)
Dept: HEMATOLOGY/ONCOLOGY | Facility: CLINIC | Age: 59
End: 2024-10-07
Payer: COMMERCIAL

## 2024-10-07 DIAGNOSIS — C50.211 MALIGNANT NEOPLASM OF UPPER-INNER QUADRANT OF RIGHT FEMALE BREAST, UNSPECIFIED ESTROGEN RECEPTOR STATUS: ICD-10-CM

## 2024-10-08 RX ORDER — ANASTROZOLE 1 MG/1
1 TABLET ORAL DAILY
Qty: 90 TABLET | Refills: 1 | Status: SHIPPED | OUTPATIENT
Start: 2024-10-08

## 2024-10-16 DIAGNOSIS — C50.211 MALIGNANT NEOPLASM OF UPPER-INNER QUADRANT OF RIGHT FEMALE BREAST, UNSPECIFIED ESTROGEN RECEPTOR STATUS: ICD-10-CM

## 2024-10-17 ENCOUNTER — TELEPHONE (OUTPATIENT)
Dept: HEMATOLOGY/ONCOLOGY | Facility: CLINIC | Age: 59
End: 2024-10-17
Payer: COMMERCIAL

## 2024-10-17 RX ORDER — ALENDRONATE SODIUM 70 MG/1
70 TABLET ORAL
Qty: 12 TABLET | Refills: 3 | Status: CANCELLED | OUTPATIENT
Start: 2024-10-17

## 2024-10-17 NOTE — TELEPHONE ENCOUNTER
[facility] requesting prescription refill on Anastrozole 1 mg and alendronate 70 mg.    Next visit 11/22/2024

## 2024-10-22 RX ORDER — ANASTROZOLE 1 MG/1
1 TABLET ORAL DAILY
Qty: 90 TABLET | Refills: 1 | Status: SHIPPED | OUTPATIENT
Start: 2024-10-22

## 2024-11-16 ENCOUNTER — LAB (OUTPATIENT)
Dept: LAB | Facility: LAB | Age: 59
End: 2024-11-16
Payer: COMMERCIAL

## 2024-11-16 DIAGNOSIS — M85.852 OSTEOPENIA OF NECK OF LEFT FEMUR: ICD-10-CM

## 2024-11-16 DIAGNOSIS — C50.211 MALIGNANT NEOPLASM OF UPPER-INNER QUADRANT OF RIGHT BREAST IN FEMALE, ESTROGEN RECEPTOR POSITIVE: ICD-10-CM

## 2024-11-16 DIAGNOSIS — Z17.0 MALIGNANT NEOPLASM OF UPPER-INNER QUADRANT OF RIGHT BREAST IN FEMALE, ESTROGEN RECEPTOR POSITIVE: ICD-10-CM

## 2024-11-16 LAB
BASOPHILS # BLD AUTO: 0.03 X10*3/UL (ref 0–0.1)
BASOPHILS NFR BLD AUTO: 0.9 %
EOSINOPHIL # BLD AUTO: 0.01 X10*3/UL (ref 0–0.7)
EOSINOPHIL NFR BLD AUTO: 0.3 %
ERYTHROCYTE [DISTWIDTH] IN BLOOD BY AUTOMATED COUNT: 14.5 % (ref 11.5–14.5)
HCT VFR BLD AUTO: 45.5 % (ref 36–46)
HGB BLD-MCNC: 14.4 G/DL (ref 12–16)
IMM GRANULOCYTES # BLD AUTO: 0 X10*3/UL (ref 0–0.7)
IMM GRANULOCYTES NFR BLD AUTO: 0 % (ref 0–0.9)
LYMPHOCYTES # BLD AUTO: 1.1 X10*3/UL (ref 1.2–4.8)
LYMPHOCYTES NFR BLD AUTO: 33 %
MCH RBC QN AUTO: 27.2 PG (ref 26–34)
MCHC RBC AUTO-ENTMCNC: 31.6 G/DL (ref 32–36)
MCV RBC AUTO: 86 FL (ref 80–100)
MONOCYTES # BLD AUTO: 0.42 X10*3/UL (ref 0.1–1)
MONOCYTES NFR BLD AUTO: 12.6 %
NEUTROPHILS # BLD AUTO: 1.77 X10*3/UL (ref 1.2–7.7)
NEUTROPHILS NFR BLD AUTO: 53.2 %
NRBC BLD-RTO: 0 /100 WBCS (ref 0–0)
PLATELET # BLD AUTO: 198 X10*3/UL (ref 150–450)
RBC # BLD AUTO: 5.29 X10*6/UL (ref 4–5.2)
WBC # BLD AUTO: 3.3 X10*3/UL (ref 4.4–11.3)

## 2024-11-16 PROCEDURE — 82378 CARCINOEMBRYONIC ANTIGEN: CPT

## 2024-11-16 PROCEDURE — 80053 COMPREHEN METABOLIC PANEL: CPT

## 2024-11-16 PROCEDURE — 86300 IMMUNOASSAY TUMOR CA 15-3: CPT

## 2024-11-16 PROCEDURE — 85025 COMPLETE CBC W/AUTO DIFF WBC: CPT

## 2024-11-17 LAB
ALBUMIN SERPL BCP-MCNC: 4.7 G/DL (ref 3.4–5)
ALP SERPL-CCNC: 59 U/L (ref 33–110)
ALT SERPL W P-5'-P-CCNC: 18 U/L (ref 7–45)
ANION GAP SERPL CALC-SCNC: 13 MMOL/L (ref 10–20)
AST SERPL W P-5'-P-CCNC: 21 U/L (ref 9–39)
BILIRUB SERPL-MCNC: 0.7 MG/DL (ref 0–1.2)
BUN SERPL-MCNC: 14 MG/DL (ref 6–23)
CALCIUM SERPL-MCNC: 9.8 MG/DL (ref 8.6–10.6)
CANCER AG27-29 SERPL-ACNC: 30.8 U/ML (ref 0–38.6)
CEA SERPL-MCNC: <0.5 UG/L
CHLORIDE SERPL-SCNC: 103 MMOL/L (ref 98–107)
CO2 SERPL-SCNC: 30 MMOL/L (ref 21–32)
CREAT SERPL-MCNC: 0.83 MG/DL (ref 0.5–1.05)
EGFRCR SERPLBLD CKD-EPI 2021: 82 ML/MIN/1.73M*2
GLUCOSE SERPL-MCNC: 86 MG/DL (ref 74–99)
POTASSIUM SERPL-SCNC: 4.5 MMOL/L (ref 3.5–5.3)
PROT SERPL-MCNC: 7.3 G/DL (ref 6.4–8.2)
SODIUM SERPL-SCNC: 141 MMOL/L (ref 136–145)

## 2024-11-21 ENCOUNTER — HOSPITAL ENCOUNTER (OUTPATIENT)
Dept: RADIOLOGY | Facility: CLINIC | Age: 59
Discharge: HOME | End: 2024-11-21
Payer: COMMERCIAL

## 2024-11-21 VITALS — HEIGHT: 63 IN | BODY MASS INDEX: 24.63 KG/M2 | WEIGHT: 139 LBS

## 2024-11-21 DIAGNOSIS — C50.211 MALIGNANT NEOPLASM OF UPPER-INNER QUADRANT OF RIGHT BREAST IN FEMALE, ESTROGEN RECEPTOR POSITIVE: ICD-10-CM

## 2024-11-21 DIAGNOSIS — Z17.0 MALIGNANT NEOPLASM OF UPPER-INNER QUADRANT OF RIGHT BREAST IN FEMALE, ESTROGEN RECEPTOR POSITIVE: ICD-10-CM

## 2024-11-21 DIAGNOSIS — M85.852 OSTEOPENIA OF NECK OF LEFT FEMUR: ICD-10-CM

## 2024-11-21 PROCEDURE — 77067 SCR MAMMO BI INCL CAD: CPT

## 2024-11-22 ENCOUNTER — OFFICE VISIT (OUTPATIENT)
Dept: HEMATOLOGY/ONCOLOGY | Facility: CLINIC | Age: 59
End: 2024-11-22
Payer: COMMERCIAL

## 2024-11-22 VITALS
TEMPERATURE: 97.5 F | HEIGHT: 63 IN | OXYGEN SATURATION: 97 % | HEART RATE: 63 BPM | BODY MASS INDEX: 23.79 KG/M2 | WEIGHT: 134.26 LBS | DIASTOLIC BLOOD PRESSURE: 80 MMHG | SYSTOLIC BLOOD PRESSURE: 127 MMHG | RESPIRATION RATE: 16 BRPM

## 2024-11-22 DIAGNOSIS — C50.211 MALIGNANT NEOPLASM OF UPPER-INNER QUADRANT OF RIGHT FEMALE BREAST, UNSPECIFIED ESTROGEN RECEPTOR STATUS: ICD-10-CM

## 2024-11-22 DIAGNOSIS — C50.211 MALIGNANT NEOPLASM OF UPPER-INNER QUADRANT OF RIGHT BREAST IN FEMALE, ESTROGEN RECEPTOR POSITIVE: ICD-10-CM

## 2024-11-22 DIAGNOSIS — Z17.0 MALIGNANT NEOPLASM OF UPPER-INNER QUADRANT OF RIGHT BREAST IN FEMALE, ESTROGEN RECEPTOR POSITIVE: ICD-10-CM

## 2024-11-22 DIAGNOSIS — M85.852 OSTEOPENIA OF NECK OF LEFT FEMUR: ICD-10-CM

## 2024-11-22 PROCEDURE — 99417 PROLNG OP E/M EACH 15 MIN: CPT | Performed by: INTERNAL MEDICINE

## 2024-11-22 PROCEDURE — 99214 OFFICE O/P EST MOD 30 MIN: CPT | Performed by: INTERNAL MEDICINE

## 2024-11-22 PROCEDURE — 3008F BODY MASS INDEX DOCD: CPT | Performed by: INTERNAL MEDICINE

## 2024-11-22 RX ORDER — ALENDRONATE SODIUM 70 MG/1
70 TABLET ORAL
Qty: 12 TABLET | Refills: 3 | Status: SHIPPED | OUTPATIENT
Start: 2024-11-22

## 2024-11-22 ASSESSMENT — PAIN SCALES - GENERAL: PAINLEVEL_OUTOF10: 0-NO PAIN

## 2024-11-22 NOTE — PATIENT INSTRUCTIONS
ORDERS & PATIENT INSTRUCTIONS:     Return for follow-up in 6-month  cont Arimidex, fosamax, calcium with vit D  CBC, CMP, CA 27-29, CEA Signatera test in 6 months

## 2024-11-22 NOTE — PROGRESS NOTES
Patient ID: Loly Jacobs is a 59 y.o. female.  Referring Physician: Isaac Christina MD  [address] [facility], [place] [address]  Primary Care Provider: Emmanuelle Angeles DO    ORDERS & PATIENT INSTRUCTIONS:    Return for follow-up in 6-month  cont Arimidex, fosamax, calcium with vit D  CBC, CMP, CA 27-29, CEA Signatera test in 6 months          ASSESSMENT, PROBLEM LIST, DECISION MAKING, PLAN.        1. Stage I, T1b N0 M0 invasive ductal carcinoma of right upper inner    quadrant, diagnosed in April 2016, tumor was 9 mm in size.  One    sentinel lymph node was negative.  The patient had a surgery.  The    patient had lumpectomy and sentinel lymph node biopsy by Dr. Hurd on April 7, 2016.  ER was greater than 95% positive.  [place]    was greater than 95% positive.  HER2/carolann initially was negative    with FISH ratio 1.1, although repeat HER2/carolann testing done on April 16, 2016, on the lumpectomy specimen showed HER2/carolann by FISH was    positive at 2.0.  The patient was offered adjuvant chemotherapy    plus Herceptin because of 9 mm tumor and HER2/carolann positive tumor,    although the patient declined chemotherapy, so she received    radiation therapy from May 2, 2016, through May 23, 2016, and later    was started on Arimidex, calcium, vitamin D, and Fosamax on July 11, 2016.    2. Osteopenia with T-score -1.4 in June 2018, follow-up in April, 2021 showed T score-1.7.  She was continued Fosamax with calcium and vitamin D.  Repeat bone density in April 2023 showed T score -1.5      INTERVAL HISTORY:  Patient is seen for followup on stage I right breast carcinoma.  Patient is currently taking Arimidex calcium vitamin D and Fosamax which was started in July 2016,   patient has been doing well denies any headache fever cough chest pain shortness of breath urinary symptom overall feels good denies any palpable mass in the breast.    Mammogram was done yesterday, result is pending  she had  a DEXA scan in April 2023 showed T score -1.5 in the left femur, otherwise was negative.    Patient denies any nausea vomiting headache fever cough chest pain shortness of breath urinary symptoms headache or seizures      PHYSICAL EXAMINATION:    General: Conscious, alert, oriented x3, not in acute distress.  HEENT:    No icterus.    Chest:Bilateral symmetrical,     CVS:  S1, S2.    Abdomen:  Soft, no palpable mass   Extremities: No clubbing, cyanosis,     Skin: No petechial rash.    Breast exam was done today shows no palpable mass in the breast, bilateral axilla without any lymphadenopathy, no nipple discharge in Nov 2024/chaperone present      ASSESSMENT AND PLAN:      Ms. Shetty  with a T1b N0 M0 stage I right upper inner quadrant breast carcinomadiagnosed in April 2016.  Had ER/[place] positive and HER2/carolann positive.patient had lumpectomy followed by radiation therapy and she has not received any chemotherapy or Herceptin, and she has been on Arimidex started in July 2016, continue Arimidex, calcium, vitamin D, and Fosamax as planned.        Mammogram done yesterday result is pending   she had a DEXA scan in April 2023 showed T score -1.5 in the left femur, otherwise was negative.  Labs done last week showed normal tumor markers CBC CMP was within normal limit as well.        Patient will take total of 10 years of Arimidex as she had a HER-2/carolann positive disease,    Osteopenia, last DEXA scan in April 2021 showed T score -1.7, continue Fosamax calcium and vitamin D.    Repeat DEXA scan in April 2023 showed T score -1.5, stable, continue current treatment.        Patient's father had prostate carcinoma, I explained to the patient that there is a potential possibility of genetic factor causing breast cancer as well as prostate cancer and offered her genetic counseling and possible genetic testing if she is interested,   Decided not to do at this time but will let me know in case if she wants to pursue it.   "      Return for follow-up in 6-month          VITALS:   1.64 meters squared /80   Pulse 63   Temp 36.4 °C (97.5 °F) (Temporal)   Resp 16   Ht (S) 1.599 m (5' 2.95\")   Wt 60.9 kg (134 lb 4.2 oz)   SpO2 97%   BMI 23.82 kg/m²     LABS:    CBC:  Recent Labs     11/16/24  0947 05/16/24  0957 10/05/23  1314 04/04/23  1251 09/29/22  0933   WBC 3.3* 3.6* 5.2 3.9* 4.4   HGB 14.4 15.2 14.0 13.6 14.7   HCT 45.5 46.9* 43.9 44.2 47.6*    168 203 256 212   MCV 86 85 86 86 87       CMP:  Recent Labs     11/16/24  0947 05/16/24  0957 10/05/23  1314 04/04/23  1251 09/29/22  0933    141 143 141 142   K 4.5 4.7 4.1 3.7 4.4    102 104 103 103   CO2 30 32 28 28 30   ANIONGAP 13 12 15 14 13   BUN 14 19 13 14 18   CREATININE 0.83 0.84 0.78 0.81 0.94   EGFR 82 81 89  --   --      Recent Labs     11/16/24  0947 05/16/24  0957 10/05/23  1314 04/04/23  1251 09/29/22  0933   ALBUMIN 4.7 4.6 4.5 4.3 4.7   ALKPHOS 59 65 57 59 55   ALT 18 17 12 12 13   AST 21 22 17 17 17   BILITOT 0.7 0.5 0.5 0.5 0.5       HEME/ENDO:No results for input(s): \"FERRITIN\", \"IRONSAT\", \"TSH\", \"ITRTIRXF83\", \"FOLATE\" in the last 72985 hours.     MICRO: No results for input(s): \"ESR\", \"CRP\", \"PROCAL\" in the last 00131 hours.  No results found for the last 90 days.        TUMOR MARKERS:  CA 27.29 (U/mL)   Date Value   11/16/2024 30.8   05/16/2024 31.6   10/05/2023 31.3     Carcinoembryonic AG (ug/L)   Date Value   11/16/2024 <0.5   05/16/2024 <0.5   10/05/2023 <0.5                IMAGING:         BI mammo bilateral screening tomosynthesis  Narrative: Interpreted By:  TRACY GREGORIO MD  MRN: [id number]  Patient Name: AFIA ELIZALDE     STUDY:  DIGITAL MAMM SCREENING W/ ALVARO;  6/22/2023 10:07 am     ACCESSION NUMBER(S):  [id number]     ORDERING CLINICIAN:  JULIANNE ALVAREZ     INDICATION:  Screening. History of right lumpectomy with radiation therapy.     COMPARISON:  06/20/2022, 06/17/2021, 06/11/2020     FINDINGS:  2D and tomosynthesis images " were reviewed at 1 mm slice thickness.     The breast tissue is heterogeneously dense, which may obscure small  masses.  There is stable architectural distortion with surgical clips  seen within the medial superior posterior aspect of the right breast.  There are diffuse bilateral benign calcifications is present. No  suspicious masses or calcifications are identified.     Impression: No mammographic evidence of malignancy.     BI-RADS CATEGORY:     Category: 2 - Benign.  Recommendation: 1 Year Screening.     For any future breast imaging appointments, please call [phone]).     Patient letter sent SNORM             Current Outpatient Medications   Medication Sig Dispense Refill    alendronate (Fosamax) 70 mg tablet Take 1 tablet (70 mg) by mouth every 7 days. 12 tablet 3    alendronate (Fosamax) 70 mg tablet Take by mouth.      anastrozole (Arimidex) 1 mg tablet Take 1 tablet (1 mg total) by mouth once daily. 90 tablet 1     No current facility-administered medications for this visit.                  SOCIAL HISTORY:    reports current alcohol use.   reports no history of drug use.,   Tobacco Use: Low Risk  (11/21/2024)    Patient History     Smoking Tobacco Use: Never     Smokeless Tobacco Use: Never     Passive Exposure: Not on file       FAMILY HISTORY:  Family History   Problem Relation Name Age of Onset    Prostate cancer Father         ALLERGY:   Patient has no known allergies.            SOCIAL HISTORY: She  reports current alcohol use. She  reports no history of drug use.      no smoking, occ wine    FHX  father has prostate ca    Allergy - none(1)        Medication reviewed in e-chart  Patient is monitored for medication toxicity  labs reviewed and interpreted independently, X rays independently reviewed  Notes from other physicians involved in care were reviewed    Charting was completed using voice recognition technology and may include unintended errors.    JULIANNE ALVAREZ MD,  KIESHA Calvert and Kevin Turner Master Clinician in Hematology and Oncology  Medical Director, [facility] at [facility].  [place] office  Phone [phone]  Fax      [phone]  [facility] /[place].  Phone [phone]  Fax     [phone]

## 2024-11-22 NOTE — PROGRESS NOTES
Refill of fosamax to be sent to [facility] by provider.  Follow-up in 6 months with labs prior including Signatera.  Breast exam today.  Arimidex, Fosamax and Calcium with Vitamin D to continue.  Call back instructions reviewed.  Patient verbalized understanding.

## 2024-11-27 ENCOUNTER — TELEPHONE (OUTPATIENT)
Dept: HEMATOLOGY/ONCOLOGY | Facility: CLINIC | Age: 59
End: 2024-11-27
Payer: COMMERCIAL

## 2024-11-27 NOTE — TELEPHONE ENCOUNTER
Per Dr. Christina,  Please inform that above test result mammogram is negative.    Left voicemail message with results.

## 2025-01-02 DIAGNOSIS — C50.211 MALIGNANT NEOPLASM OF UPPER-INNER QUADRANT OF RIGHT FEMALE BREAST, UNSPECIFIED ESTROGEN RECEPTOR STATUS: ICD-10-CM

## 2025-05-16 ENCOUNTER — LAB (OUTPATIENT)
Dept: LAB | Facility: CLINIC | Age: 60
End: 2025-05-16
Payer: COMMERCIAL

## 2025-05-21 ENCOUNTER — LAB (OUTPATIENT)
Dept: LAB | Facility: HOSPITAL | Age: 60
End: 2025-05-21
Payer: COMMERCIAL

## 2025-05-21 ENCOUNTER — PATIENT MESSAGE (OUTPATIENT)
Dept: HEMATOLOGY/ONCOLOGY | Facility: CLINIC | Age: 60
End: 2025-05-21
Payer: COMMERCIAL

## 2025-05-21 DIAGNOSIS — C50.211 MALIGNANT NEOPLASM OF UPPER-INNER QUADRANT OF RIGHT FEMALE BREAST: Primary | ICD-10-CM

## 2025-05-21 DIAGNOSIS — C50.211 MALIGNANT NEOPLASM OF UPPER-INNER QUADRANT OF RIGHT BREAST IN FEMALE, ESTROGEN RECEPTOR POSITIVE: ICD-10-CM

## 2025-05-21 DIAGNOSIS — M85.852 OTHER SPECIFIED DISORDERS OF BONE DENSITY AND STRUCTURE, LEFT THIGH: ICD-10-CM

## 2025-05-21 DIAGNOSIS — Z17.0 MALIGNANT NEOPLASM OF UPPER-INNER QUADRANT OF RIGHT BREAST IN FEMALE, ESTROGEN RECEPTOR POSITIVE: ICD-10-CM

## 2025-05-21 DIAGNOSIS — M85.852 OSTEOPENIA OF NECK OF LEFT FEMUR: ICD-10-CM

## 2025-05-21 LAB
ALBUMIN SERPL BCP-MCNC: 4.4 G/DL (ref 3.4–5)
ALP SERPL-CCNC: 58 U/L (ref 33–110)
ALT SERPL W P-5'-P-CCNC: 17 U/L (ref 7–45)
ANION GAP SERPL CALC-SCNC: 11 MMOL/L (ref 10–20)
AST SERPL W P-5'-P-CCNC: 21 U/L (ref 9–39)
BASOPHILS # BLD AUTO: 0.02 X10*3/UL (ref 0–0.1)
BASOPHILS NFR BLD AUTO: 0.5 %
BILIRUB SERPL-MCNC: 0.6 MG/DL (ref 0–1.2)
BUN SERPL-MCNC: 16 MG/DL (ref 6–23)
CALCIUM SERPL-MCNC: 9.3 MG/DL (ref 8.6–10.3)
CANCER AG27-29 SERPL-ACNC: 21.1 U/ML (ref 0–38.6)
CEA SERPL-MCNC: <0.5 UG/L
CHLORIDE SERPL-SCNC: 103 MMOL/L (ref 98–107)
CO2 SERPL-SCNC: 30 MMOL/L (ref 21–32)
CREAT SERPL-MCNC: 0.81 MG/DL (ref 0.5–1.05)
EGFRCR SERPLBLD CKD-EPI 2021: 84 ML/MIN/1.73M*2
EOSINOPHIL # BLD AUTO: 0.06 X10*3/UL (ref 0–0.7)
EOSINOPHIL NFR BLD AUTO: 1.6 %
ERYTHROCYTE [DISTWIDTH] IN BLOOD BY AUTOMATED COUNT: 14.6 % (ref 11.5–14.5)
GLUCOSE SERPL-MCNC: 93 MG/DL (ref 74–99)
HCT VFR BLD AUTO: 45.7 % (ref 36–46)
HGB BLD-MCNC: 14.4 G/DL (ref 12–16)
IMM GRANULOCYTES # BLD AUTO: 0 X10*3/UL (ref 0–0.7)
IMM GRANULOCYTES NFR BLD AUTO: 0 % (ref 0–0.9)
LYMPHOCYTES # BLD AUTO: 1.47 X10*3/UL (ref 1.2–4.8)
LYMPHOCYTES NFR BLD AUTO: 39 %
MCH RBC QN AUTO: 27.1 PG (ref 26–34)
MCHC RBC AUTO-ENTMCNC: 31.5 G/DL (ref 32–36)
MCV RBC AUTO: 86 FL (ref 80–100)
MONOCYTES # BLD AUTO: 0.39 X10*3/UL (ref 0.1–1)
MONOCYTES NFR BLD AUTO: 10.3 %
NEUTROPHILS # BLD AUTO: 1.83 X10*3/UL (ref 1.2–7.7)
NEUTROPHILS NFR BLD AUTO: 48.6 %
NRBC BLD-RTO: 0 /100 WBCS (ref 0–0)
PLATELET # BLD AUTO: 213 X10*3/UL (ref 150–450)
POTASSIUM SERPL-SCNC: 4.3 MMOL/L (ref 3.5–5.3)
PROT SERPL-MCNC: 6.5 G/DL (ref 6.4–8.2)
RBC # BLD AUTO: 5.31 X10*6/UL (ref 4–5.2)
SODIUM SERPL-SCNC: 140 MMOL/L (ref 136–145)
WBC # BLD AUTO: 3.8 X10*3/UL (ref 4.4–11.3)

## 2025-05-21 PROCEDURE — 80053 COMPREHEN METABOLIC PANEL: CPT

## 2025-05-21 PROCEDURE — 82378 CARCINOEMBRYONIC ANTIGEN: CPT

## 2025-05-21 PROCEDURE — 85025 COMPLETE CBC W/AUTO DIFF WBC: CPT

## 2025-05-21 PROCEDURE — 36415 COLL VENOUS BLD VENIPUNCTURE: CPT

## 2025-05-21 PROCEDURE — 86300 IMMUNOASSAY TUMOR CA 15-3: CPT

## 2025-05-27 ENCOUNTER — OFFICE VISIT (OUTPATIENT)
Dept: HEMATOLOGY/ONCOLOGY | Facility: CLINIC | Age: 60
End: 2025-05-27
Payer: COMMERCIAL

## 2025-05-27 ENCOUNTER — LAB (OUTPATIENT)
Dept: LAB | Facility: CLINIC | Age: 60
End: 2025-05-27
Payer: COMMERCIAL

## 2025-05-27 VITALS
HEART RATE: 71 BPM | BODY MASS INDEX: 24.23 KG/M2 | TEMPERATURE: 98.1 F | DIASTOLIC BLOOD PRESSURE: 84 MMHG | RESPIRATION RATE: 16 BRPM | WEIGHT: 136.57 LBS | OXYGEN SATURATION: 98 % | SYSTOLIC BLOOD PRESSURE: 126 MMHG

## 2025-05-27 DIAGNOSIS — C50.211 MALIGNANT NEOPLASM OF UPPER-INNER QUADRANT OF RIGHT BREAST IN FEMALE, ESTROGEN RECEPTOR POSITIVE: ICD-10-CM

## 2025-05-27 DIAGNOSIS — C50.211 MALIGNANT NEOPLASM OF UPPER-INNER QUADRANT OF RIGHT FEMALE BREAST, UNSPECIFIED ESTROGEN RECEPTOR STATUS: ICD-10-CM

## 2025-05-27 DIAGNOSIS — M85.852 OSTEOPENIA OF NECK OF LEFT FEMUR: ICD-10-CM

## 2025-05-27 DIAGNOSIS — Z17.0 MALIGNANT NEOPLASM OF UPPER-INNER QUADRANT OF RIGHT BREAST IN FEMALE, ESTROGEN RECEPTOR POSITIVE: ICD-10-CM

## 2025-05-27 PROCEDURE — 36415 COLL VENOUS BLD VENIPUNCTURE: CPT

## 2025-05-27 PROCEDURE — 99214 OFFICE O/P EST MOD 30 MIN: CPT | Performed by: INTERNAL MEDICINE

## 2025-05-27 ASSESSMENT — PAIN SCALES - GENERAL: PAINLEVEL_OUTOF10: 0-NO PAIN

## 2025-05-27 NOTE — PROGRESS NOTES
Signatera drawn today as ordered.  Teaching reiterated that results can take 4-6 weeks for initial draw.  Follow-up in 6 months with labs prior at [facility].  Patient to continue Arimidex, Fosamax and Calcium + Vitamin C.  No refills needed at this time.  Call back instructions reviewed.  Patient verbalized understanding.

## 2025-05-27 NOTE — PROGRESS NOTES
Patient ID: Loly Jacobs is a 60 y.o. female.  Referring Physician: Isaac Christina MD  [address] [facility], [place] [address]  Primary Care Provider: Emmanuelle Angeles DO    ORDERS & PATIENT INSTRUCTIONS:  Mammogram after November 26, 2025  Signatera was done today    Return for follow-up in 6-month  cont Arimidex, fosamax, calcium with vit D  CBC, CMP, CA 27-29, CEA          ASSESSMENT, PROBLEM LIST, DECISION MAKING, PLAN.        1. Stage I, T1b N0 M0 invasive ductal carcinoma of right upper inner    quadrant, diagnosed in April 2016, tumor was 9 mm in size.  One    sentinel lymph node was negative.  The patient had a surgery.  The    patient had lumpectomy and sentinel lymph node biopsy by Dr. Hurd on April 7, 2016.  ER was greater than 95% positive.  [place]    was greater than 95% positive.  HER2/carolann initially was negative    with FISH ratio 1.1, although repeat HER2/carolann testing done on April 16, 2016, on the lumpectomy specimen showed HER2/carolann by FISH was    positive at 2.0.  The patient was offered adjuvant chemotherapy    plus Herceptin because of 9 mm tumor and HER2/carolann positive tumor,    although the patient declined chemotherapy, so she received    radiation therapy from May 2, 2016, through May 23, 2016, and later    was started on Arimidex, calcium, vitamin D, and Fosamax on July 11, 2016.    2. Osteopenia with T-score -1.4 in June 2018, follow-up in April, 2021 showed T score-1.7.  She was continued Fosamax with calcium and vitamin D.  Repeat bone density in April 2023 showed T score -1.5      INTERVAL HISTORY:  Patient is seen for followup on stage I right breast carcinoma.    Patient denies any nausea vomiting headache fever cough chest pain shortness of breath urinary symptoms headache or seizures      PHYSICAL EXAMINATION:    General: Conscious, alert, oriented x3, not in acute distress.  HEENT:    No icterus.    Chest:Bilateral symmetrical,     CVS:  S1, S2.     Abdomen:  Soft, no palpable mass   Extremities: No clubbing, cyanosis,     Skin: No petechial rash.    Breast exam was done today shows no palpable mass in the breast, bilateral axilla without any lymphadenopathy, no nipple discharge in Nov 2024      ASSESSMENT AND PLAN:      Ms. Shetty  with a T1b N0 M0 stage I right upper inner quadrant breast carcinomadiagnosed in April 2016.  Had ER/[place] positive and HER2/carolann positive.patient had lumpectomy followed by radiation therapy and she has not received any chemotherapy or Herceptin, and she has been on Arimidex started in July 2016, continue Arimidex, calcium, vitamin D, and Fosamax as planned.      Mammogram was negative in November 2024  All the tumor markers are normal and clinically there is no evidence of progression, Signatera test is pending today  She has 1 more year of hormonal therapy and will finish in July 2026          she had a DEXA scan in April 2023 showed T score -1.5 in the left femur, otherwise was negative.        Patient will take total of 10 years of Arimidex as she had a HER-2/carolann positive disease,    Osteopenia, last DEXA scan in April 2021 showed T score -1.7, continue Fosamax calcium and vitamin D.    Repeat DEXA scan in April 2023 showed T score -1.5, stable, continue current treatment.    Will repeat DEXA scan next year    Patient's father had prostate carcinoma, I explained to the patient that there is a potential possibility of genetic factor causing breast cancer as well as prostate cancer and offered her genetic counseling and possible genetic testing if she is interested,   Decided not to do at this time but will let me know in case if she wants to pursue it.        Return for follow-up in 6-month          VITALS:   1.66 meters squared /84   Pulse 71   Temp 36.7 °C (98.1 °F)   Resp 16   Wt 62 kg (136 lb 9.2 oz)   SpO2 98%   BMI 24.23 kg/m²     LABS:    CBC:  Recent Labs     05/21/25  0813 11/16/24  0947 05/16/24  0957  "10/05/23  1314 04/04/23  1251   WBC 3.8* 3.3* 3.6* 5.2 3.9*   HGB 14.4 14.4 15.2 14.0 13.6   HCT 45.7 45.5 46.9* 43.9 44.2    198 168 203 256   MCV 86 86 85 86 86       CMP:  Recent Labs     05/21/25  0813 11/16/24  0947 05/16/24  0957 10/05/23  1314 04/04/23  1251    141 141 143 141   K 4.3 4.5 4.7 4.1 3.7    103 102 104 103   CO2 30 30 32 28 28   ANIONGAP 11 13 12 15 14   BUN 16 14 19 13 14   CREATININE 0.81 0.83 0.84 0.78 0.81   EGFR 84 82 81 89  --      Recent Labs     05/21/25 0813 11/16/24  0947 05/16/24  0957 10/05/23  1314 04/04/23  1251   ALBUMIN 4.4 4.7 4.6 4.5 4.3   ALKPHOS 58 59 65 57 59   ALT 17 18 17 12 12   AST 21 21 22 17 17   BILITOT 0.6 0.7 0.5 0.5 0.5       HEME/ENDO:No results for input(s): \"FERRITIN\", \"IRONSAT\", \"TSH\", \"CEHLTQHV90\", \"FOLATE\" in the last 55342 hours.     MICRO: No results for input(s): \"ESR\", \"CRP\", \"PROCAL\" in the last 63477 hours.  No results found for the last 90 days.        TUMOR MARKERS:  CA 27.29 (U/mL)   Date Value   05/21/2025 21.1   11/16/2024 30.8   05/16/2024 31.6   10/05/2023 31.3     Carcinoembryonic AG (ug/L)   Date Value   05/21/2025 <0.5   11/16/2024 <0.5   05/16/2024 <0.5   10/05/2023 <0.5                IMAGING:         BI mammo bilateral screening tomosynthesis  Narrative: Interpreted By:  Osman, Dayv,   STUDY:  BI MAMMO BILATERAL SCREENING TOMOSYNTHESIS;  11/21/2024 2:46 pm      ACCESSION NUMBER(S):  [id number]      ORDERING CLINICIAN:  JULIANNE ALVAREZ      INDICATION:  Screening. Right lumpectomy with radiation therapy.      ,C50.211 Malignant neoplasm of upper-inner quadrant of right female  breast,Z17.0 Estrogen receptor positive status (ER+),M85.852 Other  specified disorders of bone density and structure, left thigh      COMPARISON:  06/22/2023 and 06/20/2022      FINDINGS:  2D and tomosynthesis images were reviewed at 1 mm slice thickness.      Density: The breasts are heterogeneously dense, which may obscure  small masses.   "    Stable postsurgical scar with associated surgical clips in the  superomedial right breast at posterior depth. No suspicious masses or  calcifications are identified bilaterally.      Impression: No mammographic evidence of malignancy.      BI-RADS CATEGORY:      BI-RADS Category:  2 Benign.  Recommendation:  Annual Screening.  Recommended Date:  1 Year.  Laterality:  Bilateral.              For any future breast imaging appointments, please call [phone]).      MACRO:  None      Signed by: Davy Brewer 11/26/2024 9:18 AM  Dictation workstation:   [id number]       Current Outpatient Medications   Medication Sig Dispense Refill    alendronate (Fosamax) 70 mg tablet Take 1 tablet (70 mg) by mouth every 7 days. 12 tablet 3    anastrozole (Arimidex) 1 mg tablet Take 1 tablet (1 mg total) by mouth once daily. 90 tablet 1     No current facility-administered medications for this visit.                  SOCIAL HISTORY:    reports current alcohol use.   reports no history of drug use.,   Tobacco Use: Low Risk  (11/21/2024)    Patient History     Smoking Tobacco Use: Never     Smokeless Tobacco Use: Never     Passive Exposure: Not on file       FAMILY HISTORY:  Family History   Problem Relation Name Age of Onset    Prostate cancer Father         ALLERGY:   Patient has no known allergies.            SOCIAL HISTORY: She  reports current alcohol use. She  reports no history of drug use.      no smoking, occ wine    FHX  father has prostate ca    Allergy - none(1)        Medication reviewed in e-chart  Patient is monitored for medication toxicity  labs reviewed and interpreted independently, X rays independently reviewed  Notes from other physicians involved in care were reviewed    Charting was completed using voice recognition technology and may include unintended errors.    JULIANNE ALVAREZ MD, RHODA.    Nathanael Turner Master Clinician in Hematology and Oncology  Medical Director, [place]  cancer Center at [facility].  [place] office  Phone [phone]  Fax      [phone]  [facility] /[place].  Phone [phone]  Fax     [phone]

## 2025-05-27 NOTE — PATIENT INSTRUCTIONS
ORDERS & PATIENT INSTRUCTIONS:  Mammogram after November 26, 2025  Signatera was done today     Return for follow-up in 6-month  cont Arimidex, fosamax, calcium with vit D  CBC, CMP, CA 27-29, CEA

## 2025-07-12 LAB
COMMENTS - MP RESULT TYPE: NORMAL
SCAN RESULT: NORMAL

## 2025-07-15 ENCOUNTER — TELEPHONE (OUTPATIENT)
Dept: HEMATOLOGY/ONCOLOGY | Facility: CLINIC | Age: 60
End: 2025-07-15
Payer: COMMERCIAL

## 2025-07-15 NOTE — TELEPHONE ENCOUNTER
----- Message from Isaac Christina sent at 7/15/2025  8:34 AM EDT -----  Please inform that above test result is negative  ----- Message -----  From: Lab, Background User  Sent: 7/12/2025   9:11 AM EDT  To: Isaac Christina MD

## 2025-07-15 NOTE — TELEPHONE ENCOUNTER
Call placed to patient.  Signatera results reviewed by phone.  Notified of providers response above.  Call back instructions reviewed.  Patient verbalized understanding.

## 2025-08-28 DIAGNOSIS — C50.211 MALIGNANT NEOPLASM OF UPPER-INNER QUADRANT OF RIGHT FEMALE BREAST, UNSPECIFIED ESTROGEN RECEPTOR STATUS: ICD-10-CM

## 2025-09-02 RX ORDER — ANASTROZOLE 1 MG/1
1 TABLET ORAL DAILY
Qty: 90 TABLET | Refills: 1 | Status: SHIPPED | OUTPATIENT
Start: 2025-09-02